# Patient Record
Sex: FEMALE | Race: OTHER | HISPANIC OR LATINO
[De-identification: names, ages, dates, MRNs, and addresses within clinical notes are randomized per-mention and may not be internally consistent; named-entity substitution may affect disease eponyms.]

---

## 2017-06-15 PROBLEM — Z00.00 ENCOUNTER FOR PREVENTIVE HEALTH EXAMINATION: Status: ACTIVE | Noted: 2017-06-15

## 2017-07-10 ENCOUNTER — FORM ENCOUNTER (OUTPATIENT)
Age: 52
End: 2017-07-10

## 2017-07-11 ENCOUNTER — APPOINTMENT (OUTPATIENT)
Dept: ORTHOPEDIC SURGERY | Facility: CLINIC | Age: 52
End: 2017-07-11

## 2017-07-11 ENCOUNTER — OUTPATIENT (OUTPATIENT)
Dept: OUTPATIENT SERVICES | Facility: HOSPITAL | Age: 52
LOS: 1 days | End: 2017-07-11
Payer: COMMERCIAL

## 2017-07-11 VITALS
SYSTOLIC BLOOD PRESSURE: 140 MMHG | BODY MASS INDEX: 20.2 KG/M2 | DIASTOLIC BLOOD PRESSURE: 80 MMHG | WEIGHT: 114 LBS | HEIGHT: 63 IN

## 2017-07-11 DIAGNOSIS — Z87.39 PERSONAL HISTORY OF OTHER DISEASES OF THE MUSCULOSKELETAL SYSTEM AND CONNECTIVE TISSUE: ICD-10-CM

## 2017-07-11 DIAGNOSIS — Z82.62 FAMILY HISTORY OF OSTEOPOROSIS: ICD-10-CM

## 2017-07-11 DIAGNOSIS — Z56.0 UNEMPLOYMENT, UNSPECIFIED: ICD-10-CM

## 2017-07-11 PROCEDURE — 72100 X-RAY EXAM L-S SPINE 2/3 VWS: CPT

## 2017-07-11 PROCEDURE — 72082 X-RAY EXAM ENTIRE SPI 2/3 VW: CPT

## 2017-07-11 PROCEDURE — 72084 X-RAY EXAM ENTIRE SPI 6/> VW: CPT | Mod: 26

## 2017-07-11 PROCEDURE — 72083 X-RAY EXAM ENTIRE SPI 4/5 VW: CPT | Mod: 26

## 2017-07-11 RX ORDER — VENLAFAXINE HCL 50 MG
TABLET ORAL
Refills: 0 | Status: ACTIVE | COMMUNITY

## 2017-07-11 RX ORDER — MELOXICAM 15 MG/1
TABLET ORAL
Refills: 0 | Status: ACTIVE | COMMUNITY

## 2017-07-11 RX ORDER — BACLOFEN 15 MG/1
TABLET ORAL
Refills: 0 | Status: ACTIVE | COMMUNITY

## 2017-07-11 SDOH — ECONOMIC STABILITY - INCOME SECURITY: UNEMPLOYMENT, UNSPECIFIED: Z56.0

## 2017-08-09 ENCOUNTER — APPOINTMENT (OUTPATIENT)
Dept: ORTHOPEDIC SURGERY | Facility: CLINIC | Age: 52
End: 2017-08-09
Payer: MEDICAID

## 2017-08-09 PROCEDURE — 99215 OFFICE O/P EST HI 40 MIN: CPT

## 2017-08-30 ENCOUNTER — APPOINTMENT (OUTPATIENT)
Dept: ORTHOPEDIC SURGERY | Facility: CLINIC | Age: 52
End: 2017-08-30
Payer: MEDICAID

## 2017-08-30 VITALS — BODY MASS INDEX: 20.2 KG/M2 | HEIGHT: 63 IN | WEIGHT: 114 LBS

## 2017-08-30 PROCEDURE — 99213 OFFICE O/P EST LOW 20 MIN: CPT

## 2017-11-08 ENCOUNTER — APPOINTMENT (OUTPATIENT)
Dept: ORTHOPEDIC SURGERY | Facility: CLINIC | Age: 52
End: 2017-11-08

## 2018-02-27 ENCOUNTER — APPOINTMENT (OUTPATIENT)
Dept: ORTHOPEDIC SURGERY | Facility: CLINIC | Age: 53
End: 2018-02-27
Payer: MEDICAID

## 2018-02-27 VITALS — BODY MASS INDEX: 20.2 KG/M2 | WEIGHT: 114 LBS | HEIGHT: 63 IN

## 2018-02-27 PROCEDURE — 99213 OFFICE O/P EST LOW 20 MIN: CPT

## 2018-07-09 ENCOUNTER — RX RENEWAL (OUTPATIENT)
Age: 53
End: 2018-07-09

## 2018-08-15 ENCOUNTER — APPOINTMENT (OUTPATIENT)
Dept: ORTHOPEDIC SURGERY | Facility: CLINIC | Age: 53
End: 2018-08-15

## 2019-01-15 ENCOUNTER — APPOINTMENT (OUTPATIENT)
Dept: ORTHOPEDIC SURGERY | Facility: CLINIC | Age: 54
End: 2019-01-15
Payer: MEDICAID

## 2019-01-15 DIAGNOSIS — M51.36 OTHER INTERVERTEBRAL DISC DEGENERATION, LUMBAR REGION: ICD-10-CM

## 2019-01-15 PROCEDURE — 99215 OFFICE O/P EST HI 40 MIN: CPT

## 2019-01-18 PROBLEM — M51.36 DISC DEGENERATION, LUMBAR: Status: ACTIVE | Noted: 2017-07-11

## 2019-01-18 NOTE — PHYSICAL EXAM
[FreeTextEntry2] : Spine: Physical Exam:\par \par General: patient is well developed, well nourished, in no acute \par distress, alert and oriented x 3. \par \par Mood and affect: normal\par \par Respiratory: no respiratory distress noted\par \par Skin: no scars over spine, skin intact, no erythema, increased warmth\par \par Alignment:The spine is well compensated in the coronal and sagittal plane. There is no asymmetry on the collado forward bend test\par \par Gait: The patient is able to toe walk and heel walk without difficulty. The patient is able to tandem gait without difficulty.\par \par Palpation: no tenderness to palpation spine or paraspinal region\par \par Range of motion: Lumbar spine ROM is significantly restricted in flexion, mild restriction other planes\par \par Neurologic Exam:\par Motor: Manual Muscle testing in the lower extremities is 5 out of 5 in all muscle groups. There is no evidence of muscular atrophy in the lower extremities. Sensory: Sensation to light touch is grossly intact in the lower extremities\par \par Reflexes: DTR are present and symmetric throughout, negative hathaway bilaterally, negative inverted radial reflex bilaterally, no clonus, plantar responses are flexor\par \par Hip Exam: No pain with internal or external rotation of hips bilaterally\par \par Special tests: Straight leg raise test negative. Cross straight leg test negative. ROLDAN test negative\par \par Vascular: Examination of the peripheral vascular system demonstrates no evidence of congestion or edema. no evidence of lymphadema bilateral lower extremities, pulses are present and symmetric in both lower extremities.  [de-identified] : XR full spine and lumbar flex ex: 7/11/17: severe disc degeneration and height loss at L5/S1 with loss of lordosis at this level. no fractures, no instability\par \par MRI 7/13/17: L5/S1 severe disc degeneration with modic changes; L4/5 and L3/4 with mild disc bulge; no stenosis. \par

## 2019-01-18 NOTE — HISTORY OF PRESENT ILLNESS
[All Other ROS Normal] : All other review of systems are negative except as noted [FreeTextEntry1] : lower back pain x 7-10 years, right buttock pain x 3-4 weeks [FreeTextEntry2] : Follow Up 1/15/19: Patient continues to have low back pain, unchanged since last visit. No improvement with physical therapy. Has now developed severe right buttock pain over the past 3-4 weeks. Denies lower extremity numbness/weakness/paresthesias. Denies bowel/bladder problems.\par \par Follow up:2/27/18: Patient continues to have severe lumbosacral pain. Has been in PT without relief. Worse with standing, walking, sitting. better with stopping movement and pain medication. denies radicular pain. denies bowel/bladder symptoms. repiorts she is sleeping on a couch and doesn’t have stable living situation, not interested in surgery at this time.\par \par \par \par Follow up: 8/30/17: Patient continues to have severe lumbosacral pain. Has been in PT without relief. Worse with standing, walking, sitting. better with stopping movement and pain medicaiton. denies radicular pain. denies bowel/bladder symptoms.\par \par \par Follow up: 8/9/17: Patient continues to have severe lumbosacral pain. Has been in PT without relief. Worse with standing, walking, sitting. better with stopping movement and pain medicaiton. denies radicular pain. denies bowel/bladder symptoms.\par \par Initial: The patient is a very pleasant 51 year old female who complains of lumbosacral pain for the past 7-10 years. reports that this is worsening over the past few years, and is a constant pain. Reports she is unable to work due to the pain. She has had extensive non operative treatment over the past 4-5 years for this condition. Was recommended surgery for this condition in 2014, but elected not to proceed. Is currently in PT with some temporary improvement after the sessiosn but the pain level overall hasn’t changed. Takes mobic and baclofen for the pain. Also with history of panic attacks, depression, anxiety. \par \par On initial presentation symptoms were as follows: Patient described the pain as constant. Patient rated the pain as 6/10 currently, 7/10 average this week, 10/10 when worst. The pain localized to lumbosacral regoin b/l. There is no radiation of pain to the lower extremities. Patient denies extremity numbness and paresthesias. The pain is relieved by stopping movement and mediction. The pain is worsened by standing, walking, sitting (worst), and activity in general. Past treatments included pharmacologic management with minimal relief. The patient has had physical therapy (3 courses) and steroid injections (epidural x 3) without relief.\par \par The patient reports no loss of hand dexterity.\par The patient states there is no loss of balance when walking.\par There is no sensory loss in the arms or legs\par The patient reports no difficulty with urination.\par \par The patient reports no history of previous spine surgery.\par The patient reports no previous spine consultation.\par \par Pain:\par Back 100 Right Leg 0 Left Leg 0\par \par The patient has no history of unexpected weight loss, no history of active cancer, no history bladder or bowel dysfunction, no night pain, no fevers or chills.\par \par The past medical history, surgical history, family history, allergies, medications, review of systems, family history and social history were reviewed and non contributory.

## 2019-01-18 NOTE — DISCUSSION/SUMMARY
[de-identified] : I have discussed my findings with the patient. Ms. Zuñiga has been suffering from severe lower back pain for the past 7-10 years, unchanged since her last visit approximately 1 year ago. The pain is located in the lumbosacral region. She completed a course of physical therapy without relief. She has now developed severe pain in her right buttock over the past 3-4 weeks. Her examination shows severe restriction of flexion of the spine with pain. Her plain radiographs show severe disc degeneration of L5/S1 with complete height loss and loss of lordosis. Her MRI of the lumbar spine demonstrates severe disc degeneration of L5/S1 with associated modic changes. There is no significant height loss at the other levels. She does have mild disc dessication and bulging at L3/4 and L4/5. She has failed extensive non operative management. I am recommending an evaluation by pain management to consider facet vs SI injections, referral to Dr. Rothman given. Follow up in 3-4 months, sooner if there is an issue. All questions answered.

## 2019-04-06 DIAGNOSIS — G89.29 LOW BACK PAIN: ICD-10-CM

## 2019-04-06 DIAGNOSIS — M54.5 LOW BACK PAIN: ICD-10-CM

## 2019-04-15 PROBLEM — M54.5 CHRONIC MIDLINE LOW BACK PAIN WITHOUT SCIATICA: Status: ACTIVE | Noted: 2017-07-11
